# Patient Record
(demographics unavailable — no encounter records)

---

## 2025-01-10 NOTE — HISTORY OF PRESENT ILLNESS
[FreeTextEntry1] : 49 y/o M w/ no significant PMH  initial evaluation and management of hyperthyroidism generally feels well and endorses no acute complaints. reports cc of ~ 4 months of unintentional weight loss, anxiety, tremors, palpitations, loose stools. no clear trigger, symptoms have gotten progressively worst. a/b muscle weakness, worst when standing, required ED visit and found to be hypokalemic, started on replacement, symptoms of muscle weakness now resolved.   1/2024 Here for /fu, generally feels well and endorses no acute complaints. no interval events since LV or TT last year, compliant w/ LT4 as instructed. resolved globus sensation and eye complaints., energy remains low/ he otherwise denies any f/c, CP, SOB,  depressed mood,  n/v, stool/urinary abn,  heat/cold intolerance, HAs, breast/nipple changes, polyuria/polydipsia/nocturia or other complaints. he again denies any dysphagia, hoarseness, neck tenderness or new palpable masses. he again denies any family history of thyroid disorders or personal exposure to ionizing radiation.

## 2025-01-10 NOTE — ASSESSMENT
[Smoking Cessation] : smoking cessation [Methimazole Therapy/PTU Therapy] : Risks and benefits of methimazole therapy/PTU therapy were discussed with the patient, including rash, liver dysfunction, and agranulocytosis. Patient was instructed to call the office for flu-like symptoms (e.g. fever and sore-throat) [FreeTextEntry1] : 1) Hypothyroidism: Appears clinically euthyroid at this time on 175 mcg of LT4 (taking med appropriately). Reassess TFTs and need for medication titration at this time. Reviewed importance of compliance and proper intake , check k as thyrotoxic hypokalemic periodic paralysis is in the differential, alarm s/s of hypokalemia and hypocalcemia reviewed. Verbalized understanding and agrees with treatment plan, will contact MD and seek emergency medical care if condition changes, has d/andrea prednisone as instructed given resolution of eye complaints.